# Patient Record
Sex: FEMALE | Race: WHITE
[De-identification: names, ages, dates, MRNs, and addresses within clinical notes are randomized per-mention and may not be internally consistent; named-entity substitution may affect disease eponyms.]

---

## 2021-01-13 ENCOUNTER — HOSPITAL ENCOUNTER (OUTPATIENT)
Dept: HOSPITAL 53 - M WUC | Age: 41
End: 2021-01-13
Attending: PODIATRIST
Payer: COMMERCIAL

## 2021-01-13 DIAGNOSIS — Z79.899: Primary | ICD-10-CM

## 2021-01-13 LAB
ALBUMIN SERPL BCG-MCNC: 4.3 GM/DL (ref 3.2–5.2)
ALT SERPL W P-5'-P-CCNC: 32 U/L (ref 12–78)
BILIRUB CONJ SERPL-MCNC: 0.1 MG/DL (ref 0–0.2)
BILIRUB SERPL-MCNC: 0.3 MG/DL (ref 0.2–1)
PROT SERPL-MCNC: 7.4 GM/DL (ref 6.4–8.2)

## 2021-05-27 ENCOUNTER — HOSPITAL ENCOUNTER (OUTPATIENT)
Dept: HOSPITAL 53 - M WUC | Age: 41
End: 2021-05-27
Attending: PHYSICIAN ASSISTANT
Payer: COMMERCIAL

## 2021-05-27 DIAGNOSIS — S30.0XXA: Primary | ICD-10-CM

## 2021-05-27 NOTE — REP
INDICATION:

CONTUSION



COMPARISON:

None.



TECHNIQUE:

AP and lateral views of the sacrum and coccyx.



FINDINGS:

Lateral view suggests a very subtle acute relatively nondisplaced fracture at the very

distal aspect of the sacrum.



IMPRESSION:

Acute fracture along the very distal aspect of the sacrum.





<Electronically signed by Frantz Gunderson > 05/27/21 [id number]

## 2022-03-29 ENCOUNTER — HOSPITAL ENCOUNTER (OUTPATIENT)
Dept: HOSPITAL 53 - M WHC | Age: 42
End: 2022-03-29
Payer: COMMERCIAL

## 2022-03-29 DIAGNOSIS — Z12.31: Primary | ICD-10-CM
